# Patient Record
Sex: MALE | Race: WHITE | NOT HISPANIC OR LATINO | Employment: FULL TIME | URBAN - METROPOLITAN AREA
[De-identification: names, ages, dates, MRNs, and addresses within clinical notes are randomized per-mention and may not be internally consistent; named-entity substitution may affect disease eponyms.]

---

## 2021-10-25 NOTE — PROGRESS NOTES
"Chief Complaint        Rectal bleeding    History of Present Illness      Federico Ramirez is a 51 y.o. male who presents to [facility] as a new patient with a history of rectal bleeding. Patient reports he had rectal bleeding for 4 to 5 years with bright red blood. He reports this occurs every day with a bowel movement. No blood is seen in the toilet bowl and with wiping. He denies any previous history of anemia. His father had colon cancer in his 60s. He reports hemorrhoids since the age of 20 and was going to get them removed about 7 years ago but did not. Patient denies fever, nausea, vomiting, weight loss, night sweats, melena, hematemesis.  Patient has never had a colonoscopy or EGD.       Results       Result Review :   The following data was reviewed by: Jeanette Joiner NP on 10/28/2021              Past Medical History       Past Medical History:   Diagnosis Date   • Heart attack (HCC)        Past Surgical History:   Procedure Laterality Date   • HERNIA REPAIR           Current Outpatient Medications:   •  atorvastatin (LIPITOR) 40 MG tablet, Take 1 tablet by mouth Daily., Disp: , Rfl:   •  diazePAM (VALIUM) 5 MG tablet, Take 1 tablet by mouth Daily., Disp: , Rfl:   •  nitroglycerin (NITROSTAT) 0.4 MG SL tablet, Take 1 tablet by mouth Daily As Needed., Disp: , Rfl:   •  PEG-KCl-NaCl-NaSulf-Na Asc-C (MOVIPREP) 100 g reconstituted solution powder, Instructions provided by the office. Colon prep., Disp: 1 each, Rfl: 0     No Known Allergies    Family History   Problem Relation Age of Onset   • Colon cancer Father    • Pancreatic cancer Father         Social History     Social History Narrative   • Not on file       Objective       Objective     Vital Signs:   /69 (BP Location: Left arm, Patient Position: Sitting, Cuff Size: Adult)   Pulse 62   Ht 177.8 cm (70\")   Wt 83.5 kg (184 lb 1.6 oz)   SpO2 98%   BMI 26.42 kg/m²     Body mass index is 26.42 kg/m².    Physical " Exam  Constitutional:       General: He is not in acute distress.     Appearance: Normal appearance. He is well-developed and normal weight.   Eyes:      Conjunctiva/sclera: Conjunctivae normal.      Pupils: Pupils are equal, round, and reactive to light.      Visual Fields: Right eye visual fields normal and left eye visual fields normal.   Cardiovascular:      Rate and Rhythm: Normal rate and regular rhythm.      Heart sounds: Normal heart sounds.   Pulmonary:      Effort: Pulmonary effort is normal. No retractions.      Breath sounds: Normal breath sounds and air entry.      Comments: Inspection of chest: normal appearance  Abdominal:      General: Bowel sounds are normal.      Palpations: Abdomen is soft.      Tenderness: There is no abdominal tenderness.      Comments: No appreciable hepatosplenomegaly   Musculoskeletal:      Cervical back: Neck supple.      Right lower leg: No edema.      Left lower leg: No edema.   Lymphadenopathy:      Cervical: No cervical adenopathy.   Skin:     Findings: No lesion.      Comments: Turgor normal   Neurological:      Mental Status: He is alert and oriented to person, place, and time.   Psychiatric:         Mood and Affect: Mood and affect normal.              Assessment & Plan          Assessment and Plan    Diagnoses and all orders for this visit:    1. Hematochezia (Primary)    2. Rectal bleeding    3. Hemorrhoids, unspecified hemorrhoid type    4. Family history of colon cancer    5. Screening for malignant neoplasm of colon    51-year-old male presented to the office today with a history of rectal bleeding, hemorrhoids, family history of colon cancer in his father. I have recommended that the patient undergo further evaluation with a colonoscopy.  I have discussed this procedure in detail with the patient.  I have discussed the risks, benefits and alternatives.  I have discussed the risk of anesthesia, bleeding and perforation.  Patient understands these risks, benefits  and alternatives and wishes to proceed.  I will schedule him at his earliest convenience. We will follow up in the office as needed. Patient agreeable to this plan will call with any questions or concerns.          Follow Up       Follow Up   Return for Follow up after endoscopy in office.  Patient was given instructions and counseling regarding his condition or for health maintenance advice. Please see specific information pulled into the AVS if appropriate.

## 2021-10-28 ENCOUNTER — PREP FOR SURGERY (OUTPATIENT)
Dept: OTHER | Facility: HOSPITAL | Age: 51
End: 2021-10-28

## 2021-10-28 ENCOUNTER — OFFICE VISIT (OUTPATIENT)
Dept: GASTROENTEROLOGY | Facility: CLINIC | Age: 51
End: 2021-10-28

## 2021-10-28 VITALS
HEART RATE: 62 BPM | WEIGHT: 184.1 LBS | HEIGHT: 70 IN | OXYGEN SATURATION: 98 % | BODY MASS INDEX: 26.36 KG/M2 | SYSTOLIC BLOOD PRESSURE: 128 MMHG | DIASTOLIC BLOOD PRESSURE: 69 MMHG

## 2021-10-28 DIAGNOSIS — K62.5 RECTAL BLEEDING: ICD-10-CM

## 2021-10-28 DIAGNOSIS — Z80.0 FAMILY HISTORY OF COLON CANCER: ICD-10-CM

## 2021-10-28 DIAGNOSIS — K92.1 HEMATOCHEZIA: Primary | ICD-10-CM

## 2021-10-28 DIAGNOSIS — Z12.11 SCREENING FOR MALIGNANT NEOPLASM OF COLON: ICD-10-CM

## 2021-10-28 DIAGNOSIS — K64.9 HEMORRHOIDS, UNSPECIFIED HEMORRHOID TYPE: ICD-10-CM

## 2021-10-28 PROCEDURE — 99204 OFFICE O/P NEW MOD 45 MIN: CPT | Performed by: NURSE PRACTITIONER

## 2021-10-28 RX ORDER — PEG-3350, SODIUM SULFATE, SODIUM CHLORIDE, POTASSIUM CHLORIDE, SODIUM ASCORBATE AND ASCORBIC ACID 7.5-2.691G
KIT ORAL
Qty: 1 EACH | Refills: 0 | Status: SHIPPED | OUTPATIENT
Start: 2021-10-28

## 2021-10-28 RX ORDER — NITROGLYCERIN 0.4 MG/1
1 TABLET SUBLINGUAL DAILY PRN
COMMUNITY
Start: 2021-07-28

## 2021-10-28 RX ORDER — ATORVASTATIN CALCIUM 40 MG/1
1 TABLET, FILM COATED ORAL DAILY
COMMUNITY
Start: 2021-10-11

## 2021-10-28 RX ORDER — DIAZEPAM 5 MG/1
1 TABLET ORAL DAILY
COMMUNITY
Start: 2021-09-30

## 2025-07-15 ENCOUNTER — HOSPITAL ENCOUNTER (EMERGENCY)
Facility: HOSPITAL | Age: 55
Discharge: HOME | End: 2025-07-15
Attending: EMERGENCY MEDICINE | Admitting: EMERGENCY MEDICINE
Payer: OTHER GOVERNMENT

## 2025-07-15 VITALS
OXYGEN SATURATION: 98 % | BODY MASS INDEX: 24.62 KG/M2 | SYSTOLIC BLOOD PRESSURE: 150 MMHG | HEART RATE: 75 BPM | WEIGHT: 172 LBS | HEIGHT: 70 IN | DIASTOLIC BLOOD PRESSURE: 89 MMHG | TEMPERATURE: 98.3 F | RESPIRATION RATE: 18 BRPM

## 2025-07-15 DIAGNOSIS — K64.9 HEMORRHOIDS, UNSPECIFIED HEMORRHOID TYPE: ICD-10-CM

## 2025-07-15 DIAGNOSIS — K62.3 RECTAL PROLAPSE: Primary | ICD-10-CM

## 2025-07-15 PROCEDURE — 99282 EMERGENCY DEPT VISIT SF MDM: CPT

## 2025-07-15 RX ORDER — DIAZEPAM 10 MG/1
1 TABLET ORAL DAILY
COMMUNITY
Start: 2025-06-27

## 2025-07-15 RX ORDER — LISINOPRIL 10 MG/1
1 TABLET ORAL DAILY
COMMUNITY
Start: 2025-06-11

## 2025-07-15 NOTE — ED PROVIDER NOTES
"SHARED VISIT ATTESTATION:    This visit was performed by myself and an APC.  I personally approved the management plan/medical decision making and take responsibility for the patient management.      SHARED VISIT NOTE:    Patient is 55 y.o. year old male that presents to the ED for evaluation of rectal protrusion.     Physical Exam    ED Course:    /89 (BP Location: Left arm, Patient Position: Lying)   Pulse 75   Temp 98.3 °F (36.8 °C) (Oral)   Resp 18   Ht 177.8 cm (70\")   Wt 78 kg (172 lb)   SpO2 98%   BMI 24.68 kg/m²       The following orders were placed and all results were independently analyzed by me:  Orders Placed This Encounter   Procedures    Ambulatory Referral to General Surgery       Medications Given in the Emergency Department:  Medications - No data to display     ED Course:    ED Course as of 07/16/25 0338   Tue Jul 15, 2025   1934 PROVIDER IN TRIAGE  Patient was evaluated by me in triage Claribel Neves PA-C.  Orders are placed and patient is currently awaiting final results and disposition.   [AS]      ED Course User Index  [AS] Claribel Neves PA-C       Labs:    Lab Results (last 24 hours)       ** No results found for the last 24 hours. **             Imaging:    No Radiology Exams Resulted Within Past 24 Hours    MDM:    Procedures                         Zay Solano DO  03:38 EDT  07/16/25         Zay Solano DO  07/16/25 0338    "

## 2025-07-15 NOTE — ED PROVIDER NOTES
Time: 7:35 PM EDT  Date of encounter:  7/15/2025  Independent Historian/Clinical History and Information was obtained by:   Patient and Family    History is limited by: N/A    Chief Complaint   Patient presents with    Hemorrhoids         History of Present Illness:  Patient is a 55 y.o. year old male who presents to the emergency department for evaluation of hemorrhoids.  Patient reports he has a longstanding history of hemorrhoids but states that they go through flares.  This morning he began having rectal pain with bowel movement.  States that he feels his hemorrhoids are flared up.  Has not tried any medications at home including Preparation H.  States that there was some clear discharge that came out of the hemorrhoids when he had a bowel movement today.  Denies rectal bleeding.  No nausea or vomiting.  No fever.  No blood thinners.  Denies any nausea or vomiting.     Patient Care Team  Primary Care Provider: Kenny Starkey APRN    Past Medical History:     No Known Allergies  Past Medical History:   Diagnosis Date    Hard to intubate     Heart attack     Hyperlipidemia      Past Surgical History:   Procedure Laterality Date    HERNIA REPAIR       Family History   Problem Relation Age of Onset    Colon cancer Father     Pancreatic cancer Father     Malig Hyperthermia Neg Hx        Home Medications:  Prior to Admission medications    Medication Sig Start Date End Date Taking? Authorizing Provider   diazePAM (VALIUM) 10 MG tablet Take 1 tablet by mouth Daily. 6/27/25  Yes ProviderLainey MD   lisinopril (PRINIVIL,ZESTRIL) 10 MG tablet Take 1 tablet by mouth Daily. 6/11/25  Yes Lainey Abernathy MD   atorvastatin (LIPITOR) 40 MG tablet Take 1 tablet by mouth Daily. 10/11/21   Lainey Abernathy MD   diazePAM (VALIUM) 5 MG tablet Take 1 tablet by mouth Daily. 9/30/21   Lainey Abernathy MD   nitroglycerin (NITROSTAT) 0.4 MG SL tablet Take 1 tablet by mouth Daily As Needed. 7/28/21   Provider  "MD Lainey   PEG-KCl-NaCl-NaSulf-Na Asc-C (MOVIPREP) 100 g reconstituted solution powder Instructions provided by the office. Colon prep. 10/28/21   Jeanette Joiner APRN        Social History:   Social History     Tobacco Use    Smoking status: Every Day     Current packs/day: 1.00     Average packs/day: 1 pack/day for 35.0 years (35.0 ttl pk-yrs)     Types: Cigarettes    Smokeless tobacco: Never   Vaping Use    Vaping status: Never Used   Substance Use Topics    Alcohol use: Not Currently     Comment: Stopped years ago    Drug use: Yes     Types: Marijuana         Review of Systems:  Review of Systems   Constitutional:  Negative for chills and fever.   HENT:  Negative for congestion and ear pain.    Eyes:  Negative for pain.   Respiratory:  Negative for cough and shortness of breath.    Cardiovascular:  Negative for chest pain.   Gastrointestinal:  Positive for rectal pain. Negative for abdominal pain, anal bleeding, blood in stool, diarrhea, nausea and vomiting.   Genitourinary:  Positive for penile pain. Negative for difficulty urinating, dysuria and hematuria.   Musculoskeletal:  Negative for myalgias.   Skin:  Negative for rash.   Neurological:  Negative for dizziness, light-headedness and headaches.        Physical Exam:  /89 (BP Location: Left arm, Patient Position: Lying)   Pulse 75   Temp 98.3 °F (36.8 °C) (Oral)   Resp 18   Ht 177.8 cm (70\")   Wt 78 kg (172 lb)   SpO2 98%   BMI 24.68 kg/m²         Physical Exam  Vitals and nursing note reviewed.   Constitutional:       General: He is not in acute distress.     Appearance: Normal appearance.   HENT:      Head: Normocephalic and atraumatic.      Nose: Nose normal.      Mouth/Throat:      Mouth: Mucous membranes are moist.   Eyes:      Extraocular Movements: Extraocular movements intact.      Conjunctiva/sclera: Conjunctivae normal.      Pupils: Pupils are equal, round, and reactive to light.   Cardiovascular:      Rate and Rhythm: Normal rate " and regular rhythm.      Pulses: Normal pulses.      Heart sounds: Normal heart sounds.   Pulmonary:      Effort: Pulmonary effort is normal.      Breath sounds: Normal breath sounds.   Abdominal:      General: Abdomen is flat. Bowel sounds are normal. There is no distension.      Palpations: Abdomen is soft.      Tenderness: There is no abdominal tenderness.   Genitourinary:     Rectum: External hemorrhoid and internal hemorrhoid present.      Comments: +rectal prolapse, +multiple hemorrhoids  Musculoskeletal:         General: Normal range of motion.      Cervical back: Normal range of motion.      Right lower leg: No edema.      Left lower leg: No edema.   Skin:     General: Skin is warm and dry.      Capillary Refill: Capillary refill takes less than 2 seconds.      Coloration: Skin is not cyanotic.   Neurological:      General: No focal deficit present.      Mental Status: He is alert and oriented to person, place, and time.   Psychiatric:         Attention and Perception: Attention and perception normal.         Mood and Affect: Mood normal.         Behavior: Behavior normal.                            Medical Decision Making:      Comorbidities that affect care:    HLD    External Notes reviewed:    None      The following orders were placed and all results were independently analyzed by me:  Orders Placed This Encounter   Procedures    Ambulatory Referral to General Surgery       Medications Given in the Emergency Department:  Medications - No data to display     ED Course:    The patient was initially evaluated in the triage area where orders were placed. The patient was later dispositioned by YUDITH High.      The patient was advised to stay for completion of workup which includes but is not limited to communication of labs and radiological results, reassessment and plan. The patient was advised that leaving prior to disposition by a provider could result in critical findings that are not communicated to  the patient.     ED Course as of 07/15/25 2014   Tue Jul 15, 2025   1934 PROVIDER IN TRIAGE  Patient was evaluated by me in triage Claribel Neves PA-C.  Orders are placed and patient is currently awaiting final results and disposition.   [AS]      ED Course User Index  [AS] Claribel Neves PA-C       Labs:    Lab Results (last 24 hours)       ** No results found for the last 24 hours. **             Imaging:    No Radiology Exams Resulted Within Past 24 Hours      Differential Diagnosis and Discussion:      Abdominal Pain: Based on the patient's signs and symptoms, I considered abdominal aortic aneurysm, small bowel obstruction, pancreatitis, acute cholecystitis, acute appendecitis, peptic ulcer disease, gastritis, colitis, endocrine disorders, irritable bowel syndrome and other differential diagnosis an etiology of the patient's abdominal pain.  Rectal pain: Differential diagnosis includes but is not limited to anal fissure, hemorrhoids, proctitis, perianal abscess, rectal prolapse, anal fistula, inflammatory bowel disease, STIs, pelvic floor dysfunction, rectal ulcer, rectal foreign body, and infection.    PROCEDURES:        No orders to display        Procedures    MDM  Risk of Complications, Morbidity, and/or Mortality  Presenting problems: moderate  Diagnostic procedures: low  Management options: low    Patient Progress  Patient progress: stable    In summary, patient came in today with rectal pain that started after having a bowel movement.  He does have a history of hemorrhoids.  On exam, patient does have a small portion of the rectal mucosa has prolapsed. No signs of necrosis. Mucosa is red, moist, and has healthy tissue. I was able to reduce this by putting large amount of sugar and putting steady pressure on the prolapsed tissue. I was able to reduce his prolapse and the patient tolerated the procedure well. He does have multiple hemorrhoids but non-thrombosed. Minimal bleeding noted after the  procedure. Will send a referral to General Surgery for evaluation for hemorrhoidectomy.              Patient Care Considerations:    ANTIBIOTICS: I considered prescribing antibiotics as an outpatient however no bacterial focus of infection was found.      Consultants/Shared Management Plan:    SHARED VISIT: I have discussed the case with my supervising physician, Dr. Solano who statesto reduce the prolapse with sugar and steady pressure. The substantive portion of the medical decision was made by the attesting physician who made or approve the management plan and will take responsibility for the patient.  Clinical findings were discussed and ultimate disposition was made in consult with supervising physician.    Social Determinants of Health:    Patient is independent, reliable, and has access to care.       Disposition and Care Coordination:    Discharged: The patient is suitable and stable for discharge with no need for consideration of admission.    I have explained the patient´s condition, diagnoses and treatment plan based on the information available to me at this time. I have answered questions and addressed any concerns. The patient has a good  understanding of the patient´s diagnosis, condition, and treatment plan as can be expected at this point. The vital signs have been stable. The patient´s condition is stable and appropriate for discharge from the emergency department.      The patient will pursue further outpatient evaluation with the primary care physician or other designated or consulting physician as outlined in the discharge instructions. They are agreeable to this plan of care and follow-up instructions have been explained in detail. The patient has received these instructions in written format and has expressed an understanding of the discharge instructions. The patient is aware that any significant change in condition or worsening of symptoms should prompt an immediate return to this or the  closest emergency department or call to 911.  I have explained discharge medications and the need for follow up with the patient/caretakers. This was also printed in the discharge instructions. Patient was discharged with the following medications and follow up:      Medication List      No changes were made to your prescriptions during this visit.      No follow-up provider specified.     Final diagnoses:   Rectal prolapse   Hemorrhoids, unspecified hemorrhoid type        ED Disposition       ED Disposition   Discharge    Condition   Stable    Comment   --               This medical record created using voice recognition software.             Zak Mohan PA  07/15/25 2014

## 2025-07-15 NOTE — Clinical Note
[facility] EMERGENCY ROOM  [address]  [place] [address]  Phone: [phone]  Fax: [phone]    Federico Ramirez was seen and treated in our emergency department on 7/15/2025.  He may return to work on 07/19/2025.         Thank you for choosing [facility].    Zak Mohan PA

## 2025-07-16 NOTE — DISCHARGE INSTRUCTIONS
Your exam and symptoms today are consistent with a rectal prolapse. I recommend taking miralax and/or metamucil daily. Drink plenty of fluids.     I have sent referral to general surgery to see if you are a good candidate for hemorrhoidectomy.  They will reach out to you in 1-2 business days.    Follow up with your Primary Care Provider in 3-5 days especially if symptoms worsen.    Return to the Emergency Department if you develop any uncontrollable fever, intractable pain, nausea, vomiting.

## 2025-07-22 ENCOUNTER — PREP FOR SURGERY (OUTPATIENT)
Dept: OTHER | Facility: HOSPITAL | Age: 55
End: 2025-07-22
Payer: OTHER GOVERNMENT

## 2025-07-22 ENCOUNTER — OFFICE VISIT (OUTPATIENT)
Dept: SURGERY | Facility: CLINIC | Age: 55
End: 2025-07-22
Payer: OTHER GOVERNMENT

## 2025-07-22 VITALS — BODY MASS INDEX: 22.85 KG/M2 | WEIGHT: 159.6 LBS | HEIGHT: 70 IN

## 2025-07-22 DIAGNOSIS — K64.9 HEMORRHOIDS, UNSPECIFIED HEMORRHOID TYPE: Primary | ICD-10-CM

## 2025-07-22 DIAGNOSIS — K64.8 OTHER HEMORRHOIDS: Primary | ICD-10-CM

## 2025-07-22 PROCEDURE — 99204 OFFICE O/P NEW MOD 45 MIN: CPT | Performed by: SURGERY

## 2025-07-22 RX ORDER — SODIUM CHLORIDE 0.9 % (FLUSH) 0.9 %
10 SYRINGE (ML) INJECTION EVERY 12 HOURS SCHEDULED
Status: CANCELLED | OUTPATIENT
Start: 2025-07-22

## 2025-07-22 RX ORDER — SODIUM CHLORIDE 0.9 % (FLUSH) 0.9 %
10 SYRINGE (ML) INJECTION AS NEEDED
Status: CANCELLED | OUTPATIENT
Start: 2025-07-22

## 2025-07-22 RX ORDER — SODIUM PHOSPHATE,MONO-DIBASIC 19G-7G/118
1 ENEMA (ML) RECTAL ONCE
Status: CANCELLED | OUTPATIENT
Start: 2025-07-22 | End: 2025-07-22

## 2025-07-22 RX ORDER — SODIUM CHLORIDE 9 MG/ML
40 INJECTION, SOLUTION INTRAVENOUS AS NEEDED
Status: CANCELLED | OUTPATIENT
Start: 2025-07-22

## 2025-07-22 RX ORDER — SODIUM CHLORIDE, SODIUM LACTATE, POTASSIUM CHLORIDE, CALCIUM CHLORIDE 600; 310; 30; 20 MG/100ML; MG/100ML; MG/100ML; MG/100ML
50 INJECTION, SOLUTION INTRAVENOUS CONTINUOUS
Status: CANCELLED | OUTPATIENT
Start: 2025-07-22 | End: 2025-07-23

## 2025-07-22 RX ORDER — LIDOCAINE 50 MG/G
1 OINTMENT TOPICAL
Qty: 30 G | Refills: 0 | Status: SHIPPED | OUTPATIENT
Start: 2025-07-22

## 2025-07-22 NOTE — LETTER
July 24, 2025     YUDITH High  [address] [place] [address]    Patient: Federico Ramirez   YOB: 1970   Date of Visit: 7/22/2025     Dear YUDITH High:       Thank you for referring Federico Ramirez to me for evaluation. Below are the relevant portions of my assessment and plan of care.    If you have questions, please do not hesitate to call me. I look forward to following Federico along with you.         Sincerely,        Christo Marcano MD        CC: CARL Peace Matthew Bruce, MD  07/24/25 0814  Sign when Signing Visit  General Surgery/Colorectal Surgery Note    Patient Name:  Federico Ramirez  YOB: 1970  [id number]    Referring Provider: Zak Mohan PA      Patient Care Team:  Kenny Starkey APRN as PCP - General (Family Medicine)    Chief complaint hemorrhoids    Subjective.     History of present illness:    Last Tuesday he developed pain around his anus with pain with bowel movements and pressure.  He has pain with sitting.  He developed some dark red blood per rectum.  He has had some prolapse since onset.  No history of the same.  No blood thinner use.  He does sit on the toilet for long periods of time.  He uses fiber.  Family history of colorectal cancer father.  Previous incomplete colonoscopy due to poor prep.      History:  Past Medical History:   Diagnosis Date   • Hard to intubate    • Heart attack    • Hyperlipidemia        Past Surgical History:   Procedure Laterality Date   • HERNIA REPAIR         Family History   Problem Relation Age of Onset   • Colon cancer Father    • Pancreatic cancer Father    • Malig Hyperthermia Neg Hx        Social History     Tobacco Use   • Smoking status: Every Day     Current packs/day: 1.00     Average packs/day: 1 pack/day for 35.0 years (35.0 ttl pk-yrs)     Types: Cigarettes     Passive exposure: Current   • Smokeless tobacco: Never   Vaping Use   • Vaping status: Never Used   Substance Use  Topics   • Alcohol use: Not Currently     Comment: Stopped years ago   • Drug use: Yes     Types: Marijuana       Review of Systems  All systems were reviewed and negative except for:   Review of Systems   Constitutional: Negative for chills, fever and unexpected weight loss.   HENT: Negative for congestion, nosebleeds and voice change.    Eyes: Negative for blurred vision, double vision and discharge.   Respiratory: Negative for apnea, chest tightness and shortness of breath.    Cardiovascular: Negative for chest pain and leg swelling.   Gastrointestinal:        See HPI   Endocrine: Negative for cold intolerance and heat intolerance.   Genitourinary: Negative for dysuria, hematuria and urgency.   Musculoskeletal: Negative for back pain, joint swelling and neck pain.   Skin: Negative for color change and dry skin.   Neurological: Negative for dizziness and confusion.   Hematological: Negative for adenopathy.   Psychiatric/Behavioral: Negative for agitation and behavioral problems.     MEDS:  Prior to Admission medications    Medication Sig Start Date End Date Taking? Authorizing Provider   atorvastatin (LIPITOR) 40 MG tablet Take 1 tablet by mouth Daily. 10/11/21  Yes Lainey Abernathy MD   diazePAM (VALIUM) 10 MG tablet Take 1 tablet by mouth Daily. 6/27/25  Yes Lainey Abernathy MD   lisinopril (PRINIVIL,ZESTRIL) 10 MG tablet Take 1 tablet by mouth Daily. 6/11/25  Yes Lainey Abernathy MD   nitroglycerin (NITROSTAT) 0.4 MG SL tablet Take 1 tablet by mouth Daily As Needed. 7/28/21  Yes Lainey Abernathy MD   diazePAM (VALIUM) 5 MG tablet Take 1 tablet by mouth Daily.  Patient not taking: Reported on 7/22/2025 9/30/21   Lainey Abernathy MD   lidocaine (XYLOCAINE) 5 % ointment Apply 1 Application topically to the appropriate area as directed Every 2 (Two) Hours As Needed for Mild Pain. 7/22/25   Christo Marcano MD   PEG-KCl-NaCl-NaSulf-Na Asc-C (MOVIPREP) 100 g reconstituted solution  "powder Instructions provided by the office. Colon prep.  Patient not taking: Reported on 7/22/2025 10/28/21   Jeanette Joiner APRN        Allergies:  Patient has no known allergies.    Objective    Vital Signs        Physical Exam:     General Appearance:    Alert, cooperative, in no acute distress   Head:    Normocephalic, without obvious abnormality, atraumatic   Eyes:          Conjunctivae and sclerae normal, no icterus,     Ears:    Ears appear intact with no abnormalities noted   Throat:   No oral lesions, no thrush, oral mucosa moist   Neck:   No adenopathy, supple, trachea midline, no thyromegaly   Back:     No kyphosis present, no scoliosis present, no skin lesions,      erythema or scars, no tenderness to percussion or                   palpation,   range of motion normal   Lungs:     Clear to auscultation,respirations regular, even and                  unlabored    Heart:    Regular rhythm and normal rate, normal S1 and S2, no            murmur, no gallop, no rub, no click   Chest Wall:    No abnormalities observed   Abdomen:     Normal bowel sounds, no masses, no organomegaly, soft        non-tender, non-distended, no guarding, no rebound                tenderness   Rectal:   Inflamed circumferential external/internal hemorrhoids with no evidence of infection   Extremities:   Moves all extremities well, no edema, no cyanosis, no             redness   Pulses:   Pulses palpable and equal bilaterally   Skin:   No bleeding, bruising or rash   Lymph nodes:   No palpable adenopathy   Neurologic:   A/o x 4 with no deficits       Results Review:   {Results Review:[id number]::\"I reviewed the patient's new clinical results.\"    LABS/IMAGING:  No results found for this or any previous visit.     Result Review: Labs  Result Review  Imaging  Med Tab  Media     Assessment & Plan    Inflamed external hemorrhoids questionable thrombosis, inflamed internal hemorrhoids    Discussion with patient.  I recommended excisional " hemorrhoidectomy in the operating room to help with symptoms.  Procedure and recovery discussed.  Benefits and alternatives discussed.  Risk procedure including risk of anesthesia, bleeding, infection, need for more surgery, anal stenosis, heart attack, stroke, blood clot, pneumonia, pain discussed.  Enema on the day of the procedure.  I explained to him he will likely need more procedures for his hemorrhoids in the future.  I will perform colonoscopy when he has recovered from his hemorrhoidectomy in the future.  Prescription given for lidocaine cream.  He was instructed to perform sitz bath's and ice the area.  All questions answered.  He agrees with the plan.  Thank for the consult.              This document has been electronically signed by Christo Marcano MD  July 24, 2025 08:12 EDT

## 2025-07-24 ENCOUNTER — ANESTHESIA EVENT (OUTPATIENT)
Dept: PERIOP | Facility: HOSPITAL | Age: 55
End: 2025-07-24
Payer: OTHER GOVERNMENT

## 2025-07-24 RX ORDER — ACETAMINOPHEN 325 MG/1
650 TABLET ORAL EVERY 6 HOURS PRN
COMMUNITY
End: 2025-07-25 | Stop reason: HOSPADM

## 2025-07-24 NOTE — PROGRESS NOTES
General Surgery/Colorectal Surgery Note    Patient Name:  Federico Ramirez  YOB: 1970  [id number]    Referring Provider: Zka Mohan PA      Patient Care Team:  Kenny Starkey APRN as PCP - General (Family Medicine)    Chief complaint hemorrhoids    Subjective .     History of present illness:    Last Tuesday he developed pain around his anus with pain with bowel movements and pressure.  He has pain with sitting.  He developed some dark red blood per rectum.  He has had some prolapse since onset.  No history of the same.  No blood thinner use.  He does sit on the toilet for long periods of time.  He uses fiber.  Family history of colorectal cancer father.  Previous incomplete colonoscopy due to poor prep.      History:  Past Medical History:   Diagnosis Date    Hard to intubate     Heart attack     Hyperlipidemia        Past Surgical History:   Procedure Laterality Date    HERNIA REPAIR         Family History   Problem Relation Age of Onset    Colon cancer Father     Pancreatic cancer Father     Malig Hyperthermia Neg Hx        Social History     Tobacco Use    Smoking status: Every Day     Current packs/day: 1.00     Average packs/day: 1 pack/day for 35.0 years (35.0 ttl pk-yrs)     Types: Cigarettes     Passive exposure: Current    Smokeless tobacco: Never   Vaping Use    Vaping status: Never Used   Substance Use Topics    Alcohol use: Not Currently     Comment: Stopped years ago    Drug use: Yes     Types: Marijuana       Review of Systems  All systems were reviewed and negative except for:   Review of Systems   Constitutional: Negative for chills, fever and unexpected weight loss.   HENT: Negative for congestion, nosebleeds and voice change.    Eyes: Negative for blurred vision, double vision and discharge.   Respiratory: Negative for apnea, chest tightness and shortness of breath.    Cardiovascular: Negative for chest pain and leg swelling.   Gastrointestinal:        See HPI   Endocrine:  Negative for cold intolerance and heat intolerance.   Genitourinary: Negative for dysuria, hematuria and urgency.   Musculoskeletal: Negative for back pain, joint swelling and neck pain.   Skin: Negative for color change and dry skin.   Neurological: Negative for dizziness and confusion.   Hematological: Negative for adenopathy.   Psychiatric/Behavioral: Negative for agitation and behavioral problems.     MEDS:  Prior to Admission medications    Medication Sig Start Date End Date Taking? Authorizing Provider   atorvastatin (LIPITOR) 40 MG tablet Take 1 tablet by mouth Daily. 10/11/21  Yes Lainey Abernathy MD   diazePAM (VALIUM) 10 MG tablet Take 1 tablet by mouth Daily. 6/27/25  Yes Lainey Abernathy MD   lisinopril (PRINIVIL,ZESTRIL) 10 MG tablet Take 1 tablet by mouth Daily. 6/11/25  Yes Lainey Abernathy MD   nitroglycerin (NITROSTAT) 0.4 MG SL tablet Take 1 tablet by mouth Daily As Needed. 7/28/21  Yes Lainey Abernathy MD   diazePAM (VALIUM) 5 MG tablet Take 1 tablet by mouth Daily.  Patient not taking: Reported on 7/22/2025 9/30/21   Lainey Abernathy MD   lidocaine (XYLOCAINE) 5 % ointment Apply 1 Application topically to the appropriate area as directed Every 2 (Two) Hours As Needed for Mild Pain. 7/22/25   Christo Marcano MD   PEG-KCl-NaCl-NaSulf-Na Asc-C (MOVIPREP) 100 g reconstituted solution powder Instructions provided by the office. Colon prep.  Patient not taking: Reported on 7/22/2025 10/28/21   Jeanette Joiner APRN        Allergies:  Patient has no known allergies.    Objective     Vital Signs        Physical Exam:     General Appearance:    Alert, cooperative, in no acute distress   Head:    Normocephalic, without obvious abnormality, atraumatic   Eyes:          Conjunctivae and sclerae normal, no icterus,     Ears:    Ears appear intact with no abnormalities noted   Throat:   No oral lesions, no thrush, oral mucosa moist   Neck:   No adenopathy, supple, trachea midline,  "no thyromegaly   Back:     No kyphosis present, no scoliosis present, no skin lesions,      erythema or scars, no tenderness to percussion or                   palpation,   range of motion normal   Lungs:     Clear to auscultation,respirations regular, even and                  unlabored    Heart:    Regular rhythm and normal rate, normal S1 and S2, no            murmur, no gallop, no rub, no click   Chest Wall:    No abnormalities observed   Abdomen:     Normal bowel sounds, no masses, no organomegaly, soft        non-tender, non-distended, no guarding, no rebound                tenderness   Rectal:   Inflamed circumferential external/internal hemorrhoids with no evidence of infection   Extremities:   Moves all extremities well, no edema, no cyanosis, no             redness   Pulses:   Pulses palpable and equal bilaterally   Skin:   No bleeding, bruising or rash   Lymph nodes:   No palpable adenopathy   Neurologic:   A/o x 4 with no deficits       Results Review:   {Results Review:24280::\"I reviewed the patient's new clinical results.\"    LABS/IMAGING:  No results found for this or any previous visit.     Result Review : Labs  Result Review  Imaging  Med Tab  Media     Assessment & Plan     Inflamed external hemorrhoids questionable thrombosis, inflamed internal hemorrhoids    Discussion with patient.  I recommended excisional hemorrhoidectomy in the operating room to help with symptoms.  Procedure and recovery discussed.  Benefits and alternatives discussed.  Risk procedure including risk of anesthesia, bleeding, infection, need for more surgery, anal stenosis, heart attack, stroke, blood clot, pneumonia, pain discussed.  Enema on the day of the procedure.  I explained to him he will likely need more procedures for his hemorrhoids in the future.  I will perform colonoscopy when he has recovered from his hemorrhoidectomy in the future.  Prescription given for lidocaine cream.  He was instructed to perform sitz " bath's and ice the area.  All questions answered.  He agrees with the plan.  Thank for the consult.              This document has been electronically signed by Christo Marcano MD  July 24, 2025 08:12 EDT

## 2025-07-24 NOTE — PRE-PROCEDURE INSTRUCTIONS
PATIENT INSTRUCTED TO BE:    - NOTHING TO EAT, DRINK OR CHEW AFTER MIDNIGHT      - TO HOLD ALL VITAMINS, SUPPLEMENTS, NSAIDS FOR ONE WEEK PRIOR TO THEIR SURGICAL PROCEDURE     - INSTRUCTED PT TO USE SURGICAL SOAP 1 TIME THE NIGHT PRIOR TO SURGERY __7/24_________ OR THE AM OF SURGERY ___7/25__________   USE THE SOAP FROM NECK TO TOES, AVOID THEIR FACE, HAIR, AND PRIVATE PARTS. IF USE THE SOAP THE NIGHT PRIOR TO SURGERY, CHANGE BED LINENS AND NO PETS IN THE BED.     INSTRUCTED NO LOTIONS, JEWELRY, PIERCINGS,  NAIL POLISH, OR DEODORANT DAY OF SURGERY    - IF DIABETIC, CHECK BLOOD GLUCOSE IF LESS THAN 70 OR HAVING SYMPTOMS CALL THE PREOP AREA FOR INSTRUCTIONS ON AM OF SURGERY ([phone])    -INSTRUCTED TO TAKE THE FOLLOWING MEDICATIONS THE DAY OF SURGERY WITH SIPS OF WATER: TYLENOL IF NEEDED, VALIUM    - DO NOT BRING ANY MEDICATIONS WITH YOU TO THE HOSPITAL THE DAY OF SURGERY, EXCEPT IF USE INHALERS. BRING INHALERS DAY OF SURGERY       - BRING CPAP OR BIPAP TO THE HOSPITAL ONLY IF YOU ARE SPENDING THE NIGHT    - DO NOT SMOKE OR VAPE 24 HOURS PRIOR TO PROCEDURE PER ANESTHESIA REQUEST     -MAKE SURE YOU HAVE A RIDE HOME OR SOMEONE TO STAY WITH YOU THE DAY OF THE PROCEDURE AFTER YOU GO HOME     - FOLLOW ANY OTHER INSTRUCTIONS GIVEN TO YOU BY YOUR SURGEON'S OFFICE.     - DAY OF SURGERY __7/25___, MAIN ENTRANCE [facility]. TAKE ELEVATOR TO FIRST FLOOR, TURN LEFT AND CHECK IN WITH REGISTRATION    - YOU WILL RECEIVE A PHONE CALL THE DAY PRIOR TO SURGERY BETWEEN 1PM AND 4 PM WITH ARRIVAL TIME, IF YOUR SURGERY IS ON A MONDAY YOU WILL RECEIVE A CALL THE FRIDAY PRIOR TO SURGERY DATE    - BRING CASH OR CREDIT CARD FOR COPAYMENT OF MEDICATIONS AFTER SURGERY IF YOU USE THE HOSPITAL PHARMACY (MEDS TO BED)    - PREADMISSION TESTING NURSE ROSANNA HARRINGTON [phone] IF HAVE ANY QUESTIONS     -PATIENT PROVIDED THE NUMBER FOR PREOP SURGICAL DEPT IF HAD QUESTIONS AFTER HOURS PRIOR TO SURGERY ([phone]).  INFORMED PT IF NO  ANSWER, LEAVE A MESSAGE AND SOMEONE WILL RETURN THEIR CALL       PATIENT VERBALIZED UNDERSTANDING

## 2025-07-24 NOTE — H&P (VIEW-ONLY)
General Surgery/Colorectal Surgery Note    Patient Name:  Federico Ramirez  YOB: 1970  [id number]    Referring Provider: Zak Mohan PA      Patient Care Team:  Kenny Starkey APRN as PCP - General (Family Medicine)    Chief complaint hemorrhoids    Subjective .     History of present illness:    Last Tuesday he developed pain around his anus with pain with bowel movements and pressure.  He has pain with sitting.  He developed some dark red blood per rectum.  He has had some prolapse since onset.  No history of the same.  No blood thinner use.  He does sit on the toilet for long periods of time.  He uses fiber.  Family history of colorectal cancer father.  Previous incomplete colonoscopy due to poor prep.      History:  Past Medical History:   Diagnosis Date    Hard to intubate     Heart attack     Hyperlipidemia        Past Surgical History:   Procedure Laterality Date    HERNIA REPAIR         Family History   Problem Relation Age of Onset    Colon cancer Father     Pancreatic cancer Father     Malig Hyperthermia Neg Hx        Social History     Tobacco Use    Smoking status: Every Day     Current packs/day: 1.00     Average packs/day: 1 pack/day for 35.0 years (35.0 ttl pk-yrs)     Types: Cigarettes     Passive exposure: Current    Smokeless tobacco: Never   Vaping Use    Vaping status: Never Used   Substance Use Topics    Alcohol use: Not Currently     Comment: Stopped years ago    Drug use: Yes     Types: Marijuana       Review of Systems  All systems were reviewed and negative except for:   Review of Systems   Constitutional: Negative for chills, fever and unexpected weight loss.   HENT: Negative for congestion, nosebleeds and voice change.    Eyes: Negative for blurred vision, double vision and discharge.   Respiratory: Negative for apnea, chest tightness and shortness of breath.    Cardiovascular: Negative for chest pain and leg swelling.   Gastrointestinal:        See HPI   Endocrine:  Negative for cold intolerance and heat intolerance.   Genitourinary: Negative for dysuria, hematuria and urgency.   Musculoskeletal: Negative for back pain, joint swelling and neck pain.   Skin: Negative for color change and dry skin.   Neurological: Negative for dizziness and confusion.   Hematological: Negative for adenopathy.   Psychiatric/Behavioral: Negative for agitation and behavioral problems.     MEDS:  Prior to Admission medications    Medication Sig Start Date End Date Taking? Authorizing Provider   atorvastatin (LIPITOR) 40 MG tablet Take 1 tablet by mouth Daily. 10/11/21  Yes Lainey Abernathy MD   diazePAM (VALIUM) 10 MG tablet Take 1 tablet by mouth Daily. 6/27/25  Yes Lainey Abernathy MD   lisinopril (PRINIVIL,ZESTRIL) 10 MG tablet Take 1 tablet by mouth Daily. 6/11/25  Yes Lainey Abernathy MD   nitroglycerin (NITROSTAT) 0.4 MG SL tablet Take 1 tablet by mouth Daily As Needed. 7/28/21  Yes Lainey Abernathy MD   diazePAM (VALIUM) 5 MG tablet Take 1 tablet by mouth Daily.  Patient not taking: Reported on 7/22/2025 9/30/21   Lainey Abernathy MD   lidocaine (XYLOCAINE) 5 % ointment Apply 1 Application topically to the appropriate area as directed Every 2 (Two) Hours As Needed for Mild Pain. 7/22/25   Christo Marcano MD   PEG-KCl-NaCl-NaSulf-Na Asc-C (MOVIPREP) 100 g reconstituted solution powder Instructions provided by the office. Colon prep.  Patient not taking: Reported on 7/22/2025 10/28/21   Jeanette Joiner APRN        Allergies:  Patient has no known allergies.    Objective     Vital Signs        Physical Exam:     General Appearance:    Alert, cooperative, in no acute distress   Head:    Normocephalic, without obvious abnormality, atraumatic   Eyes:          Conjunctivae and sclerae normal, no icterus,     Ears:    Ears appear intact with no abnormalities noted   Throat:   No oral lesions, no thrush, oral mucosa moist   Neck:   No adenopathy, supple, trachea midline,  "no thyromegaly   Back:     No kyphosis present, no scoliosis present, no skin lesions,      erythema or scars, no tenderness to percussion or                   palpation,   range of motion normal   Lungs:     Clear to auscultation,respirations regular, even and                  unlabored    Heart:    Regular rhythm and normal rate, normal S1 and S2, no            murmur, no gallop, no rub, no click   Chest Wall:    No abnormalities observed   Abdomen:     Normal bowel sounds, no masses, no organomegaly, soft        non-tender, non-distended, no guarding, no rebound                tenderness   Rectal:   Inflamed circumferential external/internal hemorrhoids with no evidence of infection   Extremities:   Moves all extremities well, no edema, no cyanosis, no             redness   Pulses:   Pulses palpable and equal bilaterally   Skin:   No bleeding, bruising or rash   Lymph nodes:   No palpable adenopathy   Neurologic:   A/o x 4 with no deficits       Results Review:   {Results Review:26525::\"I reviewed the patient's new clinical results.\"    LABS/IMAGING:  No results found for this or any previous visit.     Result Review : Labs  Result Review  Imaging  Med Tab  Media     Assessment & Plan     Inflamed external hemorrhoids questionable thrombosis, inflamed internal hemorrhoids    Discussion with patient.  I recommended excisional hemorrhoidectomy in the operating room to help with symptoms.  Procedure and recovery discussed.  Benefits and alternatives discussed.  Risk procedure including risk of anesthesia, bleeding, infection, need for more surgery, anal stenosis, heart attack, stroke, blood clot, pneumonia, pain discussed.  Enema on the day of the procedure.  I explained to him he will likely need more procedures for his hemorrhoids in the future.  I will perform colonoscopy when he has recovered from his hemorrhoidectomy in the future.  Prescription given for lidocaine cream.  He was instructed to perform sitz " bath's and ice the area.  All questions answered.  He agrees with the plan.  Thank for the consult.              This document has been electronically signed by Christo Marcano MD  July 24, 2025 08:12 EDT

## 2025-07-25 ENCOUNTER — ANESTHESIA (OUTPATIENT)
Dept: PERIOP | Facility: HOSPITAL | Age: 55
End: 2025-07-25
Payer: OTHER GOVERNMENT

## 2025-07-25 ENCOUNTER — HOSPITAL ENCOUNTER (OUTPATIENT)
Facility: HOSPITAL | Age: 55
Setting detail: HOSPITAL OUTPATIENT SURGERY
Discharge: HOME | End: 2025-07-25
Attending: SURGERY | Admitting: SURGERY
Payer: OTHER GOVERNMENT

## 2025-07-25 VITALS
SYSTOLIC BLOOD PRESSURE: 137 MMHG | HEART RATE: 81 BPM | OXYGEN SATURATION: 100 % | DIASTOLIC BLOOD PRESSURE: 82 MMHG | WEIGHT: 158.73 LBS | TEMPERATURE: 97.9 F | RESPIRATION RATE: 14 BRPM | HEIGHT: 70 IN | BODY MASS INDEX: 22.72 KG/M2

## 2025-07-25 DIAGNOSIS — K64.9 HEMORRHOIDS, UNSPECIFIED HEMORRHOID TYPE: ICD-10-CM

## 2025-07-25 PROCEDURE — 25010000002 PROPOFOL 10 MG/ML EMULSION

## 2025-07-25 PROCEDURE — 25010000002 MIDAZOLAM PER 1MG: Performed by: ANESTHESIOLOGY

## 2025-07-25 PROCEDURE — 46260 REMOVE IN/EX HEM GROUPS 2+: CPT | Performed by: SURGERY

## 2025-07-25 PROCEDURE — 25010000002 DROPERIDOL PER 5 MG: Performed by: ANESTHESIOLOGY

## 2025-07-25 PROCEDURE — 88304 TISSUE EXAM BY PATHOLOGIST: CPT | Performed by: SURGERY

## 2025-07-25 PROCEDURE — 25010000002 ONDANSETRON PER 1 MG

## 2025-07-25 PROCEDURE — 25010000002 HYDROMORPHONE 1 MG/ML SOLUTION

## 2025-07-25 PROCEDURE — 25010000002 DEXAMETHASONE PER 1 MG

## 2025-07-25 PROCEDURE — 25010000002 SUGAMMADEX 200 MG/2ML SOLUTION

## 2025-07-25 PROCEDURE — 25810000003 LACTATED RINGERS PER 1000 ML: Performed by: ANESTHESIOLOGY

## 2025-07-25 PROCEDURE — 25010000002 LIDOCAINE PF 2% 2 % SOLUTION

## 2025-07-25 PROCEDURE — 25010000002 FENTANYL CITRATE (PF) 50 MCG/ML SOLUTION

## 2025-07-25 PROCEDURE — 25010000002 HYDROMORPHONE 1 MG/ML SOLUTION: Performed by: ANESTHESIOLOGY

## 2025-07-25 PROCEDURE — 25010000002 ESMOLOL 100 MG/10ML SOLUTION

## 2025-07-25 PROCEDURE — 25010000002 CEFAZOLIN PER 500 MG: Performed by: SURGERY

## 2025-07-25 PROCEDURE — 25010000002 HYDRALAZINE PER 20 MG: Performed by: ANESTHESIOLOGY

## 2025-07-25 RX ORDER — SODIUM CHLORIDE 0.9 % (FLUSH) 0.9 %
10 SYRINGE (ML) INJECTION EVERY 12 HOURS SCHEDULED
Status: DISCONTINUED | OUTPATIENT
Start: 2025-07-25 | End: 2025-07-25 | Stop reason: HOSPADM

## 2025-07-25 RX ORDER — MIDAZOLAM HYDROCHLORIDE 2 MG/2ML
2 INJECTION, SOLUTION INTRAMUSCULAR; INTRAVENOUS ONCE
Status: COMPLETED | OUTPATIENT
Start: 2025-07-25 | End: 2025-07-25

## 2025-07-25 RX ORDER — PROPOFOL 10 MG/ML
VIAL (ML) INTRAVENOUS AS NEEDED
Status: DISCONTINUED | OUTPATIENT
Start: 2025-07-25 | End: 2025-07-25 | Stop reason: SURG

## 2025-07-25 RX ORDER — PROMETHAZINE HYDROCHLORIDE 25 MG/1
25 SUPPOSITORY RECTAL ONCE AS NEEDED
Status: DISCONTINUED | OUTPATIENT
Start: 2025-07-25 | End: 2025-07-25 | Stop reason: HOSPADM

## 2025-07-25 RX ORDER — ACETAMINOPHEN 500 MG
1000 TABLET ORAL ONCE
Status: COMPLETED | OUTPATIENT
Start: 2025-07-25 | End: 2025-07-25

## 2025-07-25 RX ORDER — DROPERIDOL 2.5 MG/ML
0.62 INJECTION, SOLUTION INTRAMUSCULAR; INTRAVENOUS ONCE
Status: COMPLETED | OUTPATIENT
Start: 2025-07-25 | End: 2025-07-25

## 2025-07-25 RX ORDER — SODIUM CHLORIDE 9 MG/ML
40 INJECTION, SOLUTION INTRAVENOUS AS NEEDED
Status: DISCONTINUED | OUTPATIENT
Start: 2025-07-25 | End: 2025-07-25 | Stop reason: HOSPADM

## 2025-07-25 RX ORDER — PROMETHAZINE HYDROCHLORIDE 25 MG/1
25 TABLET ORAL ONCE AS NEEDED
Status: DISCONTINUED | OUTPATIENT
Start: 2025-07-25 | End: 2025-07-25 | Stop reason: HOSPADM

## 2025-07-25 RX ORDER — SODIUM CHLORIDE, SODIUM LACTATE, POTASSIUM CHLORIDE, CALCIUM CHLORIDE 600; 310; 30; 20 MG/100ML; MG/100ML; MG/100ML; MG/100ML
50 INJECTION, SOLUTION INTRAVENOUS CONTINUOUS
Status: DISCONTINUED | OUTPATIENT
Start: 2025-07-25 | End: 2025-07-25 | Stop reason: HOSPADM

## 2025-07-25 RX ORDER — ESMOLOL HYDROCHLORIDE 10 MG/ML
INJECTION INTRAVENOUS AS NEEDED
Status: DISCONTINUED | OUTPATIENT
Start: 2025-07-25 | End: 2025-07-25 | Stop reason: SURG

## 2025-07-25 RX ORDER — ONDANSETRON 2 MG/ML
4 INJECTION INTRAMUSCULAR; INTRAVENOUS ONCE AS NEEDED
Status: DISCONTINUED | OUTPATIENT
Start: 2025-07-25 | End: 2025-07-25 | Stop reason: HOSPADM

## 2025-07-25 RX ORDER — OXYCODONE AND ACETAMINOPHEN 7.5; 325 MG/1; MG/1
1 TABLET ORAL EVERY 4 HOURS PRN
Qty: 20 TABLET | Refills: 0 | Status: SHIPPED | OUTPATIENT
Start: 2025-07-25 | End: 2026-07-25

## 2025-07-25 RX ORDER — SODIUM CHLORIDE, SODIUM LACTATE, POTASSIUM CHLORIDE, CALCIUM CHLORIDE 600; 310; 30; 20 MG/100ML; MG/100ML; MG/100ML; MG/100ML
9 INJECTION, SOLUTION INTRAVENOUS CONTINUOUS PRN
Status: DISCONTINUED | OUTPATIENT
Start: 2025-07-25 | End: 2025-07-25 | Stop reason: HOSPADM

## 2025-07-25 RX ORDER — SODIUM PHOSPHATE,MONO-DIBASIC 19G-7G/118
1 ENEMA (ML) RECTAL ONCE
Status: COMPLETED | OUTPATIENT
Start: 2025-07-25 | End: 2025-07-25

## 2025-07-25 RX ORDER — ONDANSETRON 2 MG/ML
INJECTION INTRAMUSCULAR; INTRAVENOUS AS NEEDED
Status: DISCONTINUED | OUTPATIENT
Start: 2025-07-25 | End: 2025-07-25 | Stop reason: SURG

## 2025-07-25 RX ORDER — LIDOCAINE HYDROCHLORIDE 20 MG/ML
INJECTION, SOLUTION EPIDURAL; INFILTRATION; INTRACAUDAL; PERINEURAL AS NEEDED
Status: DISCONTINUED | OUTPATIENT
Start: 2025-07-25 | End: 2025-07-25 | Stop reason: SURG

## 2025-07-25 RX ORDER — IBUPROFEN 600 MG/1
600 TABLET, FILM COATED ORAL 3 TIMES DAILY
Qty: 15 TABLET | Refills: 0 | Status: SHIPPED | OUTPATIENT
Start: 2025-07-25 | End: 2025-07-30

## 2025-07-25 RX ORDER — FENTANYL CITRATE 50 UG/ML
INJECTION, SOLUTION INTRAMUSCULAR; INTRAVENOUS AS NEEDED
Status: DISCONTINUED | OUTPATIENT
Start: 2025-07-25 | End: 2025-07-25 | Stop reason: SURG

## 2025-07-25 RX ORDER — ROCURONIUM BROMIDE 10 MG/ML
INJECTION, SOLUTION INTRAVENOUS AS NEEDED
Status: DISCONTINUED | OUTPATIENT
Start: 2025-07-25 | End: 2025-07-25 | Stop reason: SURG

## 2025-07-25 RX ORDER — OXYCODONE HYDROCHLORIDE 5 MG/1
5 TABLET ORAL
Status: COMPLETED | OUTPATIENT
Start: 2025-07-25 | End: 2025-07-25

## 2025-07-25 RX ORDER — POLYETHYLENE GLYCOL 3350 17 G/17G
17 POWDER, FOR SOLUTION ORAL 2 TIMES DAILY
Qty: 476 G | Refills: 0 | Status: SHIPPED | OUTPATIENT
Start: 2025-07-25

## 2025-07-25 RX ORDER — SODIUM CHLORIDE 0.9 % (FLUSH) 0.9 %
10 SYRINGE (ML) INJECTION AS NEEDED
Status: DISCONTINUED | OUTPATIENT
Start: 2025-07-25 | End: 2025-07-25 | Stop reason: HOSPADM

## 2025-07-25 RX ORDER — DEXAMETHASONE SODIUM PHOSPHATE 4 MG/ML
INJECTION, SOLUTION INTRA-ARTICULAR; INTRALESIONAL; INTRAMUSCULAR; INTRAVENOUS; SOFT TISSUE AS NEEDED
Status: DISCONTINUED | OUTPATIENT
Start: 2025-07-25 | End: 2025-07-25 | Stop reason: SURG

## 2025-07-25 RX ORDER — HYDRALAZINE HYDROCHLORIDE 20 MG/ML
10 INJECTION INTRAMUSCULAR; INTRAVENOUS ONCE
Status: COMPLETED | OUTPATIENT
Start: 2025-07-25 | End: 2025-07-25

## 2025-07-25 RX ADMIN — FENTANYL CITRATE 50 MCG: 50 INJECTION, SOLUTION INTRAMUSCULAR; INTRAVENOUS at 17:45

## 2025-07-25 RX ADMIN — SUGAMMADEX 200 MG: 100 INJECTION, SOLUTION INTRAVENOUS at 18:04

## 2025-07-25 RX ADMIN — ROCURONIUM BROMIDE 50 MG: 10 INJECTION, SOLUTION INTRAVENOUS at 17:28

## 2025-07-25 RX ADMIN — SODIUM PHOSPHATE, DIBASIC AND SODIUM PHOSPHATE, MONOBASIC 1 ENEMA: 7; 19 ENEMA RECTAL at 14:04

## 2025-07-25 RX ADMIN — MIDAZOLAM HYDROCHLORIDE 2 MG: 1 INJECTION, SOLUTION INTRAMUSCULAR; INTRAVENOUS at 16:52

## 2025-07-25 RX ADMIN — DEXAMETHASONE SODIUM PHOSPHATE 4 MG: 4 INJECTION, SOLUTION INTRAMUSCULAR; INTRAVENOUS at 17:32

## 2025-07-25 RX ADMIN — OXYCODONE HYDROCHLORIDE 5 MG: 5 TABLET ORAL at 19:11

## 2025-07-25 RX ADMIN — HYDROMORPHONE HYDROCHLORIDE 0.5 MG: 1 INJECTION, SOLUTION INTRAMUSCULAR; INTRAVENOUS; SUBCUTANEOUS at 17:49

## 2025-07-25 RX ADMIN — ESMOLOL HYDROCHLORIDE 30 MG: 100 INJECTION, SOLUTION INTRAVENOUS at 17:53

## 2025-07-25 RX ADMIN — HYDROMORPHONE HYDROCHLORIDE 0.5 MG: 1 INJECTION, SOLUTION INTRAMUSCULAR; INTRAVENOUS; SUBCUTANEOUS at 18:42

## 2025-07-25 RX ADMIN — HYDROMORPHONE HYDROCHLORIDE 0.5 MG: 1 INJECTION, SOLUTION INTRAMUSCULAR; INTRAVENOUS; SUBCUTANEOUS at 16:45

## 2025-07-25 RX ADMIN — ONDANSETRON 4 MG: 2 INJECTION INTRAMUSCULAR; INTRAVENOUS at 17:32

## 2025-07-25 RX ADMIN — DROPERIDOL 0.62 MG: 2.5 INJECTION, SOLUTION INTRAMUSCULAR; INTRAVENOUS at 19:39

## 2025-07-25 RX ADMIN — LIDOCAINE HYDROCHLORIDE 80 MG: 20 INJECTION, SOLUTION INTRAVENOUS at 17:28

## 2025-07-25 RX ADMIN — FENTANYL CITRATE 50 MCG: 50 INJECTION, SOLUTION INTRAMUSCULAR; INTRAVENOUS at 17:28

## 2025-07-25 RX ADMIN — HYDRALAZINE HYDROCHLORIDE 10 MG: 20 INJECTION INTRAMUSCULAR; INTRAVENOUS at 18:55

## 2025-07-25 RX ADMIN — ONDANSETRON 4 MG: 2 INJECTION INTRAMUSCULAR; INTRAVENOUS at 19:39

## 2025-07-25 RX ADMIN — ACETAMINOPHEN 1000 MG: 500 TABLET ORAL at 13:06

## 2025-07-25 RX ADMIN — SODIUM CHLORIDE 2000 MG: 9 INJECTION, SOLUTION INTRAVENOUS at 17:35

## 2025-07-25 RX ADMIN — SODIUM CHLORIDE, POTASSIUM CHLORIDE, SODIUM LACTATE AND CALCIUM CHLORIDE 9 ML/HR: 600; 310; 30; 20 INJECTION, SOLUTION INTRAVENOUS at 13:07

## 2025-07-25 RX ADMIN — PROPOFOL 150 MG: 10 INJECTION, EMULSION INTRAVENOUS at 17:28

## 2025-07-25 RX ADMIN — HYDROMORPHONE HYDROCHLORIDE 0.5 MG: 1 INJECTION, SOLUTION INTRAMUSCULAR; INTRAVENOUS; SUBCUTANEOUS at 18:52

## 2025-07-25 RX ADMIN — OXYCODONE HYDROCHLORIDE 5 MG: 5 TABLET ORAL at 18:52

## 2025-07-25 NOTE — DISCHARGE INSTRUCTIONS
DISCHARGE INSTRUCTIONS  HEMORRHOIDECTOMY      For your surgery you had:  General anesthesia (you may have a sore throat for the first 24 hours)    You may experience dizziness, drowsiness, or light-headedness for several hours following surgery/procedure.  Do not stay alone today or tonight.  Limit your activity for 24 hours.  Resume your diet slowly.  Follow whatever special dietary instructions you may have been given by your doctor.  You should not drive or operate machinery, drink alcohol, or sign legally binding documents for 24 hours or while you are taking pain medication.    NOTIFY YOUR DOCTOR IF YOU EXPERIENCE ANY OF THE FOLLOWING:  Temperature greater than 101 degrees Fahrenheit  Shaking Chills  Redness or excessive drainage from incision  Nausea, vomiting and/or pain that is not controlled by prescribed medications  Increase in bleeding or bleeding that is excessive  Unable to urinate in 6 hours after surgery  If unable to reach your doctor, please go to the closest Emergency Room  It is important to avoid constipation at this time so the MD will prescribe stool softeners. Eating a high-fiber diet and drinking plenty of liquids also helps. A small to moderate amount bleeding usually when having a bowel movement may occur for a week or two following the surgery. This is normal and should stop when the anus and rectum heal.  Heavy lifting needs to be avoided for 2-3 weeks.  An ice pack can help reduce swelling. Soaking in a sitz bath (shallow bath of warm water) several times a day helps ease the discomfort.     Last dose of pain medication was given at:   .    SPECIAL INSTRUCTIONS:

## 2025-07-25 NOTE — ANESTHESIA PREPROCEDURE EVALUATION
Anesthesia Evaluation     Patient summary reviewed and Nursing notes reviewed   history of anesthetic complications (unlikely difficult intubation, no records available):  Difficult airway  NPO Solid Status: > 8 hours  NPO Liquid Status: > 2 hours           Airway   Mallampati: II  TM distance: >3 FB  Neck ROM: full  Possible difficult intubation  Dental    (+) lower dentures and upper dentures    Pulmonary - negative pulmonary ROS and normal exam    breath sounds clear to auscultation  Cardiovascular - normal exam  Exercise tolerance: good (4-7 METS)    Rhythm: regular  Rate: normal    (+) hypertension, past MI  >12 months, CAD, hyperlipidemia    ROS comment: No CP x 3 months, rare CP relieved with ntg last episode, stable    Neuro/Psych- negative ROS  GI/Hepatic/Renal/Endo    (+) GI bleeding lower     Musculoskeletal (-) negative ROS    Abdominal    Substance History - negative use     OB/GYN negative ob/gyn ROS         Other - negative ROS       ROS/Med Hx Other: >4METS.HX CAD,MI X2 LAST >5YRS,HTN,HLD,COLON CA.STRESS 8/10/22 LOW RISK STUDY, ECHO 8/10/22 EF 54%,NO VALVE STENOSIS.FOLLOWS PCP LAST OV 2/20/25. DIFFICULT INTUBATION. KT                     Anesthesia Plan    ASA 3     general     (Patient understands anesthesia not responsible for dental damage.  Airway looks ok, have video DL at bedside.  Remote hx of possible hard intubation with hernia surgery 20 years ago)  intravenous induction     Anesthetic plan, risks, benefits, and alternatives have been provided, discussed and informed consent has been obtained with: patient.  Pre-procedure education provided  Plan discussed with CRNA.        CODE STATUS:

## 2025-07-25 NOTE — OP NOTE
OP NOTE  HEMORRHOIDECTOMY  Procedure Report    Patient Name:  Federico Ramirez  YOB: 1970  [id number]    Date of Surgery:  7/25/2025     Indications: See last clinic note for indications, discussion of risk benefits and alternative    Pre-op Diagnosis:   Hemorrhoids, unspecified hemorrhoid type [K64.9]       Post-Op Diagnosis Codes:     * Hemorrhoids, unspecified hemorrhoid type [K64.9]    Procedure:  Excisional hemorrhoidectomy of left lateral, right anterior, right posterior external/internal hemorrhoid    Staff:  Surgeon(s):  Christo Marcano MD    Assistant: Kylee Ribera CSA    Anesthesia: General, Local    Estimated Blood Loss: 20 mL    Implants:    Nothing was implanted during the procedure    Specimen:          Specimens       ID Source Type Tests Collected By Collected At Frozen?    A Hemorrhoid(s) Tissue TISSUE PATHOLOGY EXAM   Christo Marcano MD 7/25/25 1745 No    Description: right posterior external internal hemorrhoid    This specimen was not marked as sent.    B Hemorrhoid(s) Tissue TISSUE PATHOLOGY EXAM   Christo Marcano MD 7/25/25 1749 No    Description: right inferior internal hemorrhoid    This specimen was not marked as sent.    C Hemorrhoid(s) Tissue TISSUE PATHOLOGY EXAM   Christo Marcano MD 7/25/25 1750 No    Description: left lateral interal external hemorrhoid    This specimen was not marked as sent.              Findings: Circumferential grade 3 internal hemorrhoids.  Excisional hemorrhoidectomy with Ram bivalve and medium Hill-Pro retractor of left lateral, right anterior, right posterior external/internal hemorrhoids with minimal excision of anoderm.    Complications: None    Description of Procedure:   After all questions were answered, consent was verified.  Patient brought to the operative room per stretcher placed in supine position arms out all extremities padded.  Bilateral lower extremity SCDs placed.  Anesthesia induced.   Preoperative IV antibiotics administered.  Patient placed in candycane lithotomy.  Patient's perianal area prepped with Betadine.  Draped in usual sterile fashion.  Critical timeout taken.  Began the procedure with exam under anesthesia, digital rectal exam with no mass and no blood.  Circumferential grade 3 internal hemorrhoids.  No thrombosis.  I then exposed the right posterior external/internal hemorrhoid bundle and excised a minimal portion of anoderm along with external and internal hemorrhoids with the LigaSure device.  No division of muscle.  Specimen sent to pathology for permanent.  I oversewed the excision site with running Vicryl suture.  This was repeated in a similar fashion for right posterior and left lateral with each specimen sent to pathology separately.  Once again I excised minimal Penederm due to the swelling of his hemorrhoids to help prevent anal stenosis.  I then obtained hemostasis with interrupted Vicryl figure-of-eight sutures.  I infiltrated with local anesthesia.  I placed Xeroform gauze in the anus followed by dressing.  At the end of the procedure all counts were correct.  I was present for the procedure    Assistant: Kylee Ribera CSA was responsible for performing the following activities: Retraction, Suction, Closing, and Placing Dressing and their skilled assistance was necessary for the success of this case.    Christo Marcano MD     Date: 7/25/2025  Time: 18:13 EDT

## 2025-07-26 NOTE — ANESTHESIA POSTPROCEDURE EVALUATION
Patient: Federico Ramirez    Procedure Summary       Date: 07/25/25 Room / Location: [facility] OR  / [facility] MAIN OR    Anesthesia Start: 1726 Anesthesia Stop: 1815    Procedure: HEMORRHOIDECTOMY, cut out hemorrhoids (Anus) Diagnosis:       Hemorrhoids, unspecified hemorrhoid type      (Hemorrhoids, unspecified hemorrhoid type [K64.9])    Surgeons: Christo Marcano MD Provider: Kurt Jimenez MD    Anesthesia Type: general ASA Status: 3            Anesthesia Type: general    Vitals  Vitals Value Taken Time   /95 07/25/25 19:06   Temp 36.6 °C (97.9 °F) 07/25/25 19:06   Pulse 76 07/25/25 19:08   Resp 14 07/25/25 19:06   SpO2 99 % 07/25/25 19:08   Vitals shown include unfiled device data.        Post Anesthesia Care and Evaluation    Patient location during evaluation: bedside  Patient participation: complete - patient participated  Level of consciousness: awake  Pain management: adequate    Airway patency: patent  PONV Status: none  Cardiovascular status: acceptable and stable  Respiratory status: acceptable  Hydration status: acceptable

## 2025-07-28 ENCOUNTER — TELEPHONE (OUTPATIENT)
Dept: SURGERY | Facility: CLINIC | Age: 55
End: 2025-07-28
Payer: OTHER GOVERNMENT

## 2025-07-28 NOTE — TELEPHONE ENCOUNTER
PATIENT HAD A HEMORRHOIDECTOMY FRIDAY EVENING.  HE HAS NOT BEEN ABLE TO HAVE A BOWEL MOVEMENT YET.      IS THIS NORMAL?    #[phone]

## 2025-07-29 LAB
CYTO UR: NORMAL
LAB AP CASE REPORT: NORMAL
LAB AP CLINICAL INFORMATION: NORMAL
PATH REPORT.FINAL DX SPEC: NORMAL
PATH REPORT.GROSS SPEC: NORMAL

## 2025-07-30 ENCOUNTER — TELEPHONE (OUTPATIENT)
Dept: SURGERY | Facility: CLINIC | Age: 55
End: 2025-07-30
Payer: OTHER GOVERNMENT

## 2025-07-30 DIAGNOSIS — K62.89 ANAL PAIN: Primary | ICD-10-CM

## 2025-07-30 RX ORDER — POLYETHYLENE GLYCOL 3350 17 G/17G
17 POWDER, FOR SOLUTION ORAL 2 TIMES DAILY
Qty: 14 PACKET | Refills: 0 | Status: SHIPPED | OUTPATIENT
Start: 2025-07-30 | End: 2025-08-06

## 2025-07-30 RX ORDER — OXYCODONE AND ACETAMINOPHEN 5; 325 MG/1; MG/1
1 TABLET ORAL EVERY 6 HOURS PRN
Qty: 12 TABLET | Refills: 0 | Status: SHIPPED | OUTPATIENT
Start: 2025-07-30

## 2025-07-30 NOTE — TELEPHONE ENCOUNTER
PATIENT CALLED.  HE SAID HE HAD A HEMORRHOIDECTOMY ON FRIDAY  HE WAS PRESCRIBED 5 DAYS OF PAIN MEDICATION.  HE CUT BACK ON THEM DUE TO CONSTIPATION.  HE HAS 5 LEFT.  THAT WILL LEAVE 2 FOR TOMORROW. HE DOESN'T THINK HE WILL BE OUT OF PAIN BY TOMORROW.     HE ASKED FOR A REFILL.      PHARMACY VERIFIED.    #[phone]

## 2025-07-31 NOTE — TELEPHONE ENCOUNTER
Called pt and let him know that his meds were sent in. Pt stated his wife had already picked them up and said thank you.

## 2025-08-05 ENCOUNTER — TELEPHONE (OUTPATIENT)
Dept: SURGERY | Facility: CLINIC | Age: 55
End: 2025-08-05
Payer: OTHER GOVERNMENT

## 2025-08-07 ENCOUNTER — PREP FOR SURGERY (OUTPATIENT)
Dept: OTHER | Facility: HOSPITAL | Age: 55
End: 2025-08-07
Payer: OTHER GOVERNMENT

## 2025-08-07 ENCOUNTER — OFFICE VISIT (OUTPATIENT)
Dept: SURGERY | Facility: CLINIC | Age: 55
End: 2025-08-07
Payer: OTHER GOVERNMENT

## 2025-08-07 VITALS — RESPIRATION RATE: 18 BRPM | WEIGHT: 156.3 LBS | HEIGHT: 70 IN | BODY MASS INDEX: 22.38 KG/M2

## 2025-08-07 DIAGNOSIS — Z12.12 SCREENING FOR COLORECTAL CANCER: Primary | ICD-10-CM

## 2025-08-07 DIAGNOSIS — K62.89 ANAL PAIN: Primary | ICD-10-CM

## 2025-08-07 DIAGNOSIS — Z12.11 SCREENING FOR COLORECTAL CANCER: Primary | ICD-10-CM

## 2025-08-07 PROCEDURE — 99024 POSTOP FOLLOW-UP VISIT: CPT | Performed by: SURGERY

## 2025-08-07 RX ORDER — SODIUM, POTASSIUM,MAG SULFATES 17.5-3.13G
SOLUTION, RECONSTITUTED, ORAL ORAL
Qty: 177 ML | Refills: 0 | Status: SHIPPED | OUTPATIENT
Start: 2025-08-07

## 2025-08-07 RX ORDER — POLYETHYLENE GLYCOL 3350 17 G/17G
17 POWDER, FOR SOLUTION ORAL DAILY
Qty: 5 PACKET | Refills: 0 | Status: SHIPPED | OUTPATIENT
Start: 2025-08-07

## 2025-08-07 RX ORDER — OXYCODONE AND ACETAMINOPHEN 5; 325 MG/1; MG/1
1 TABLET ORAL EVERY 6 HOURS PRN
Qty: 12 TABLET | Refills: 0 | Status: SHIPPED | OUTPATIENT
Start: 2025-08-07

## 2025-08-08 ENCOUNTER — TELEPHONE (OUTPATIENT)
Dept: SURGERY | Facility: CLINIC | Age: 55
End: 2025-08-08
Payer: OTHER GOVERNMENT

## 2025-08-12 ENCOUNTER — TELEPHONE (OUTPATIENT)
Dept: SURGERY | Facility: CLINIC | Age: 55
End: 2025-08-12
Payer: OTHER GOVERNMENT

## 2025-08-13 ENCOUNTER — TELEPHONE (OUTPATIENT)
Dept: SURGERY | Facility: CLINIC | Age: 55
End: 2025-08-13
Payer: OTHER GOVERNMENT

## 2025-08-13 DIAGNOSIS — K62.89 ANAL PAIN: Primary | ICD-10-CM

## 2025-08-13 RX ORDER — TRAMADOL HYDROCHLORIDE 50 MG/1
50 TABLET ORAL EVERY 6 HOURS PRN
Qty: 14 TABLET | Refills: 0 | Status: SHIPPED | OUTPATIENT
Start: 2025-08-13 | End: 2026-08-13

## 2025-08-14 ENCOUNTER — TELEPHONE (OUTPATIENT)
Dept: SURGERY | Facility: CLINIC | Age: 55
End: 2025-08-14
Payer: OTHER GOVERNMENT

## (undated) DEVICE — KENDALL SCD EXPRESS SLEEVES, KNEE LENGTH, MEDIUM: Brand: KENDALL SCD

## (undated) DEVICE — MINOR-LF: Brand: MEDLINE INDUSTRIES, INC.

## (undated) DEVICE — SYR LUERLOK 30CC

## (undated) DEVICE — GLV SURG BIOGEL LTX PF 7 1/2

## (undated) DEVICE — STERILE POLYISOPRENE POWDER-FREE SURGICAL GLOVES: Brand: PROTEXIS

## (undated) DEVICE — ANTIBACTERIAL VIOLET BRAIDED (POLYGLACTIN 910), SYNTHETIC ABSORBABLE SUTURE: Brand: COATED VICRYL

## (undated) DEVICE — SUT VIC 3/0 SH 27IN J416H

## (undated) DEVICE — BRIEF KNIT SEAMLSS PREM 70IN 3XL PK/2

## (undated) DEVICE — DRAPE,UNDERBUTTOCKS,PCH,STERILE: Brand: MEDLINE

## (undated) DEVICE — GLV SURG SENSICARE PI LF PF 7.5 GRN STRL

## (undated) DEVICE — DEV OPN LIGASURE SM/JAW 28D 16.5MM 18.8CM 1P/U

## (undated) DEVICE — LEGGINGS, PAIR, CLEAR, STERILE: Brand: MEDLINE

## (undated) DEVICE — STERILE POLYISOPRENE POWDER-FREE SURGICAL GLOVES WITH EMOLLIENT COATING: Brand: PROTEXIS

## (undated) DEVICE — VAGINAL PREP TRAY: Brand: MEDLINE INDUSTRIES, INC.

## (undated) DEVICE — PENCL SMOKE/EVAC MEGADYNE TELESCP 10FT

## (undated) DEVICE — DRESSING,GAUZE,XEROFORM,CURAD,5"X9",ST: Brand: CURAD

## (undated) DEVICE — PAD,ABDOMINAL,8"X10",STERILE,LF,1/PK: Brand: MEDLINE

## (undated) DEVICE — THE STERILE LIGHT HANDLE COVER IS USED WITH STERIS SURGICAL LIGHTING AND VISUALIZATION SYSTEMS.

## (undated) DEVICE — YANKAUER,BULB TIP,W/O VENT,RIGID,STERILE: Brand: MEDLINE

## (undated) DEVICE — STRAP STIRUP SLP RNG 19X3.5IN DISP

## (undated) DEVICE — GAUZE,SPONGE,4"X4",16PLY,STRL,LF,10/TRAY: Brand: MEDLINE